# Patient Record
Sex: MALE | Race: WHITE | NOT HISPANIC OR LATINO | Employment: FULL TIME | URBAN - METROPOLITAN AREA
[De-identification: names, ages, dates, MRNs, and addresses within clinical notes are randomized per-mention and may not be internally consistent; named-entity substitution may affect disease eponyms.]

---

## 2019-04-26 ENCOUNTER — RECORDS - HEALTHEAST (OUTPATIENT)
Dept: LAB | Facility: CLINIC | Age: 29
End: 2019-04-26

## 2019-04-27 LAB
CHOLEST SERPL-MCNC: 222 MG/DL
FASTING STATUS PATIENT QL REPORTED: ABNORMAL
HDLC SERPL-MCNC: 40 MG/DL
LDLC SERPL CALC-MCNC: 132 MG/DL
TRIGL SERPL-MCNC: 250 MG/DL

## 2019-12-09 ENCOUNTER — HEALTH MAINTENANCE LETTER (OUTPATIENT)
Age: 29
End: 2019-12-09

## 2023-07-30 ENCOUNTER — OFFICE VISIT (OUTPATIENT)
Dept: FAMILY MEDICINE | Facility: OTHER | Age: 33
End: 2023-07-30
Attending: NURSE PRACTITIONER
Payer: COMMERCIAL

## 2023-07-30 VITALS
OXYGEN SATURATION: 98 % | HEART RATE: 67 BPM | RESPIRATION RATE: 20 BRPM | HEIGHT: 65 IN | SYSTOLIC BLOOD PRESSURE: 116 MMHG | TEMPERATURE: 98.3 F | WEIGHT: 168.2 LBS | DIASTOLIC BLOOD PRESSURE: 82 MMHG | BODY MASS INDEX: 28.02 KG/M2

## 2023-07-30 DIAGNOSIS — H10.33 ACUTE BACTERIAL CONJUNCTIVITIS OF BOTH EYES: Primary | ICD-10-CM

## 2023-07-30 PROCEDURE — 99203 OFFICE O/P NEW LOW 30 MIN: CPT | Performed by: NURSE PRACTITIONER

## 2023-07-30 RX ORDER — OFLOXACIN 3 MG/ML
1-2 SOLUTION/ DROPS OPHTHALMIC 4 TIMES DAILY
Qty: 2 ML | Refills: 0 | Status: SHIPPED | OUTPATIENT
Start: 2023-07-30 | End: 2023-08-04

## 2023-07-30 RX ORDER — ERYTHROMYCIN 5 MG/G
0.5 OINTMENT OPHTHALMIC 4 TIMES DAILY
Qty: 10 G | Refills: 0 | Status: SHIPPED | OUTPATIENT
Start: 2023-07-30 | End: 2023-08-04

## 2023-07-30 ASSESSMENT — PAIN SCALES - GENERAL: PAINLEVEL: MILD PAIN (3)

## 2023-07-30 NOTE — NURSING NOTE
Pt here for both eyes red and crusting.  Ainsley Corona CMA (AAMA)......................7/30/2023  10:13 AM       Medication Reconciliation: complete    Ainsley Corona CMA  7/30/2023 10:13 AM      FOOD SECURITY SCREENING QUESTIONS:    The next two questions are to help us understand your food security.  If you are feeling you need any assistance in this area, we have resources available to support you today.    Hunger Vital Signs:  Within the past 12 months we worried whether our food would run out before we got money to buy more. Never  Within the past 12 months the food we bought just didn't last and we didn't have money to get more. Never  Ainsley Corona CMA,LPN on 7/30/2023 at 10:13 AM

## 2023-07-30 NOTE — PROGRESS NOTES
ASSESSMENT/PLAN:     I have reviewed the nursing notes.  I have reviewed the findings, diagnosis, plan and need for follow up with the patient.      1. Acute bacterial conjunctivitis of both eyes    - ofloxacin (OCUFLOX) 0.3 % ophthalmic solution; Place 1-2 drops into both eyes 4 times daily for 5 days  Dispense: 2 mL; Refill: 0  - erythromycin (ROMYCIN) 5 MG/GM ophthalmic ointment; Place 0.5 inches into both eyes 4 times daily for 5 days  Dispense: 10 g; Refill: 0    Severe bacterial conjunctivitis with eyelid involvement therefore will treat with both drops and ointment.  Symptomatic treatment -moist compresses, hand hygiene, etc.    May use over-the-counter Tylenol or ibuprofen PRN    Discussed warning signs/symptoms indicative of need to f/u  Follow up if symptoms persist or worsen or concerns      I explained my diagnostic considerations and recommendations to the patient, who voiced understanding and agreement with the treatment plan. All questions were answered. We discussed potential side effects of any prescribed or recommended therapies, as well as expectations for response to treatments.    Toma Avendaño NP  [facility] [place]      SUBJECTIVE:   Andrew James Abercrombie is a 32 year old male who presents to clinic today for the following health issues:  Eye concern    HPI  Bilateral eyes with goopy drainage, crusting, redness, irritation of eyes, and eyelid discomfort for the past 6 days.  States they have been having pink eye going through the household - started with child returning from summer camp.  Wears glasses but no contact lenses.  Mild sore throat.  Minimal nasal congestion.  No cough.  No fevers.  No headaches.    Using hot and cold compresses, breast milk, and pink eye drops.      Past Medical History:   Diagnosis Date    NO ACTIVE PROBLEMS      Past Surgical History:   Procedure Laterality Date    SURGICAL HISTORY OF -       removal of accessory finger in the left hand   "    Social History     Tobacco Use    Smoking status: Passive Smoke Exposure - Never Smoker    Smokeless tobacco: Never   Substance Use Topics    Alcohol use: Yes     Comment: social     No current outpatient medications on file.     No Known Allergies      Past medical history, past surgical history, current medications and allergies reviewed and accurate to the best of my knowledge.        OBJECTIVE:     /82 (BP Location: Right arm, Patient Position: Sitting, Cuff Size: Adult Regular)   Pulse 67   Temp 98.3  F (36.8  C) (Tympanic)   Resp 20   Ht 1.638 m (5' 4.5\")   Wt 76.3 kg (168 lb 3.2 oz)   SpO2 98%   BMI 28.43 kg/m    Body mass index is 28.43 kg/m .      Physical Exam  General Appearance: Miserable but nontoxic appearing adult male, appropriate appearance for age. No acute distress  Eyes: Bilateral eyes with injection, diffuse erythema and irritation, corneas clear without noted subconjunctival hemorrhage, hypopyon or hyphema, thick goopy drainage and crusting present.  Bilateral upper and lower eyelids with erythema and excoriation, no edema.  PERRLA.  Orophayrnx: voice clear.    Nose:  No noted drainage or congestion   Respiratory: normal chest wall and respirations.  Normal effort.  No cough appreciated.  Musculoskeletal:  Equal movement of bilateral upper extremities.  Equal movement of bilateral lower extremities.  Normal gait.    Psychological: normal affect, alert, oriented, and pleasant.         "